# Patient Record
Sex: FEMALE | Race: WHITE | NOT HISPANIC OR LATINO
[De-identification: names, ages, dates, MRNs, and addresses within clinical notes are randomized per-mention and may not be internally consistent; named-entity substitution may affect disease eponyms.]

---

## 2017-11-06 ENCOUNTER — APPOINTMENT (OUTPATIENT)
Dept: OBGYN | Facility: CLINIC | Age: 57
End: 2017-11-06
Payer: COMMERCIAL

## 2017-11-06 VITALS
SYSTOLIC BLOOD PRESSURE: 135 MMHG | BODY MASS INDEX: 34.1 KG/M2 | HEIGHT: 68 IN | DIASTOLIC BLOOD PRESSURE: 84 MMHG | WEIGHT: 225 LBS

## 2017-11-06 DIAGNOSIS — Z01.419 ENCOUNTER FOR GYNECOLOGICAL EXAMINATION (GENERAL) (ROUTINE) W/OUT ABNORMAL FINDINGS: ICD-10-CM

## 2017-11-06 PROCEDURE — 99396 PREV VISIT EST AGE 40-64: CPT

## 2017-11-07 LAB — HPV HIGH+LOW RISK DNA PNL CVX: NOT DETECTED

## 2017-11-10 LAB — CYTOLOGY CVX/VAG DOC THIN PREP: NORMAL

## 2020-01-03 ENCOUNTER — TRANSCRIPTION ENCOUNTER (OUTPATIENT)
Age: 60
End: 2020-01-03

## 2020-03-05 ENCOUNTER — TRANSCRIPTION ENCOUNTER (OUTPATIENT)
Age: 60
End: 2020-03-05

## 2020-07-18 ENCOUNTER — TRANSCRIPTION ENCOUNTER (OUTPATIENT)
Age: 60
End: 2020-07-18

## 2020-09-22 ENCOUNTER — TRANSCRIPTION ENCOUNTER (OUTPATIENT)
Age: 60
End: 2020-09-22

## 2021-03-15 ENCOUNTER — APPOINTMENT (OUTPATIENT)
Dept: OBGYN | Facility: CLINIC | Age: 61
End: 2021-03-15
Payer: COMMERCIAL

## 2021-03-15 VITALS — TEMPERATURE: 97.5 F

## 2021-03-15 VITALS
HEIGHT: 68 IN | BODY MASS INDEX: 36.53 KG/M2 | WEIGHT: 241 LBS | SYSTOLIC BLOOD PRESSURE: 124 MMHG | DIASTOLIC BLOOD PRESSURE: 70 MMHG

## 2021-03-15 PROCEDURE — 99386 PREV VISIT NEW AGE 40-64: CPT

## 2021-03-15 PROCEDURE — 99072 ADDL SUPL MATRL&STAF TM PHE: CPT

## 2021-03-15 NOTE — HISTORY OF PRESENT ILLNESS
[FreeTextEntry1] : re establish care\par S/P supracervical hys \par No problems  [Patient reported mammogram was normal] : Patient reported mammogram was normal [Patient reported PAP Smear was normal] : Patient reported PAP Smear was normal [Patient reported colonoscopy was normal] : Patient reported colonoscopy was normal [Mammogramdate] : 2021  [PapSmeardate] : 2017  [BoneDensityDate] : 2019 [ColonoscopyDate] : 5 years ago

## 2021-03-18 LAB
CYTOLOGY CVX/VAG DOC THIN PREP: NORMAL
HPV HIGH+LOW RISK DNA PNL CVX: NOT DETECTED

## 2023-03-13 ENCOUNTER — APPOINTMENT (OUTPATIENT)
Dept: OBGYN | Facility: CLINIC | Age: 63
End: 2023-03-13
Payer: COMMERCIAL

## 2023-03-13 VITALS
WEIGHT: 230 LBS | BODY MASS INDEX: 34.86 KG/M2 | SYSTOLIC BLOOD PRESSURE: 122 MMHG | DIASTOLIC BLOOD PRESSURE: 80 MMHG | HEIGHT: 68 IN

## 2023-03-13 DIAGNOSIS — Z01.419 ENCOUNTER FOR GYNECOLOGICAL EXAMINATION (GENERAL) (ROUTINE) W/OUT ABNORMAL FINDINGS: ICD-10-CM

## 2023-03-13 DIAGNOSIS — N89.8 OTHER SPECIFIED NONINFLAMMATORY DISORDERS OF VAGINA: ICD-10-CM

## 2023-03-13 PROCEDURE — 99396 PREV VISIT EST AGE 40-64: CPT

## 2023-03-13 NOTE — HISTORY OF PRESENT ILLNESS
[Patient reported mammogram was normal] : Patient reported mammogram was normal [Patient reported PAP Smear was normal] : Patient reported PAP Smear was normal [FreeTextEntry1] : no issues\par s/p supracervical hys \par Patient also complaining of vaginal odor  [Mammogramdate] : 2022 [TextBox_19] : per patient  [PapSmeardate] : 2021  [TextBox_43] : UTD

## 2023-03-14 LAB — HPV HIGH+LOW RISK DNA PNL CVX: NOT DETECTED

## 2023-03-16 LAB — CYTOLOGY CVX/VAG DOC THIN PREP: NORMAL

## 2023-10-12 ENCOUNTER — NON-APPOINTMENT (OUTPATIENT)
Age: 63
End: 2023-10-12

## 2023-10-15 ENCOUNTER — NON-APPOINTMENT (OUTPATIENT)
Age: 63
End: 2023-10-15

## 2024-04-09 ENCOUNTER — APPOINTMENT (OUTPATIENT)
Dept: CARDIOLOGY | Facility: CLINIC | Age: 64
End: 2024-04-09
Payer: MEDICARE

## 2024-04-09 VITALS
DIASTOLIC BLOOD PRESSURE: 76 MMHG | BODY MASS INDEX: 36.68 KG/M2 | SYSTOLIC BLOOD PRESSURE: 116 MMHG | HEIGHT: 68 IN | HEART RATE: 78 BPM | OXYGEN SATURATION: 96 % | WEIGHT: 242 LBS

## 2024-04-09 DIAGNOSIS — R06.02 SHORTNESS OF BREATH: ICD-10-CM

## 2024-04-09 PROCEDURE — 99204 OFFICE O/P NEW MOD 45 MIN: CPT

## 2024-04-09 PROCEDURE — 93000 ELECTROCARDIOGRAM COMPLETE: CPT

## 2024-04-09 RX ORDER — METRONIDAZOLE 7.5 MG/G
0.75 GEL VAGINAL
Qty: 1 | Refills: 0 | Status: DISCONTINUED | COMMUNITY
Start: 2023-03-13 | End: 2024-04-09

## 2024-04-11 NOTE — HISTORY OF PRESENT ILLNESS
[FreeTextEntry1] : Ms. Monet is a 65 yo female with a hx migraines, SRINI (pending therapy) presenting to establish care.   Pt had recent physical 1/2024 with reportedly normal lab work.  Reports having a recent carotid a duplex and echocardiogram as well.    Does c/o recent exertional dyspnea over the past few weeks. Feels winded when climbing a flight of stairs which is new for her.  Denies chest pain, LE edema, palpitations, syncope, dizziness.     Tobacco use: none Alcohol use: weekends- couple drinks at a time Drug use: none  Family hx: 4 siblings  Prior pregnancies : 3; 2 twins and 1 other child - pre - eclampsia post-partum with twin pregnancy  Exercise: no formal, trying to walk more  Caffeine: coffee twice daily

## 2024-04-11 NOTE — ASSESSMENT
[FreeTextEntry1] : Exertional dyspnea  requested recent lab work, echo, carotid performed by PCP  will order coronary CT   will plan for f/u in 1 year unless above findings abnormal  The patient was counseled for 15 minutes regarding the need for aggressive risk factor modification including an effort to achieve an ideal body weight, to limit salt intake, to eat a heart healthy diet, to limit alcohol intake and to increase their physical activity to a level which is appropriate for their age and their conditions.

## 2024-05-08 ENCOUNTER — OUTPATIENT (OUTPATIENT)
Dept: OUTPATIENT SERVICES | Facility: HOSPITAL | Age: 64
LOS: 1 days | End: 2024-05-08
Payer: MEDICARE

## 2024-05-08 ENCOUNTER — APPOINTMENT (OUTPATIENT)
Dept: CT IMAGING | Facility: CLINIC | Age: 64
End: 2024-05-08
Payer: MEDICARE

## 2024-05-08 DIAGNOSIS — R06.02 SHORTNESS OF BREATH: ICD-10-CM

## 2024-05-08 DIAGNOSIS — I25.10 ATHEROSCLEROTIC HEART DISEASE OF NATIVE CORONARY ARTERY WITHOUT ANGINA PECTORIS: ICD-10-CM

## 2024-05-08 PROCEDURE — 75574 CT ANGIO HRT W/3D IMAGE: CPT

## 2024-05-08 PROCEDURE — 75574 CT ANGIO HRT W/3D IMAGE: CPT | Mod: 26,MH

## 2024-07-12 ENCOUNTER — APPOINTMENT (OUTPATIENT)
Dept: INTERNAL MEDICINE | Facility: CLINIC | Age: 64
End: 2024-07-12
Payer: MEDICARE

## 2024-07-12 VITALS
BODY MASS INDEX: 37.83 KG/M2 | TEMPERATURE: 97.7 F | DIASTOLIC BLOOD PRESSURE: 78 MMHG | HEIGHT: 67 IN | OXYGEN SATURATION: 98 % | SYSTOLIC BLOOD PRESSURE: 128 MMHG | WEIGHT: 241 LBS | RESPIRATION RATE: 16 BRPM | HEART RATE: 91 BPM

## 2024-07-12 DIAGNOSIS — R91.1 SOLITARY PULMONARY NODULE: ICD-10-CM

## 2024-07-12 PROCEDURE — ZZZZZ: CPT

## 2024-07-12 PROCEDURE — 94727 GAS DIL/WSHOT DETER LNG VOL: CPT

## 2024-07-12 PROCEDURE — 99203 OFFICE O/P NEW LOW 30 MIN: CPT | Mod: 25

## 2024-07-12 PROCEDURE — 94060 EVALUATION OF WHEEZING: CPT

## 2024-07-12 PROCEDURE — 94729 DIFFUSING CAPACITY: CPT

## 2024-07-31 ENCOUNTER — APPOINTMENT (OUTPATIENT)
Dept: CT IMAGING | Facility: CLINIC | Age: 64
End: 2024-07-31
Payer: MEDICARE

## 2024-07-31 ENCOUNTER — OUTPATIENT (OUTPATIENT)
Dept: OUTPATIENT SERVICES | Facility: HOSPITAL | Age: 64
LOS: 1 days | End: 2024-07-31
Payer: MEDICARE

## 2024-07-31 DIAGNOSIS — R91.1 SOLITARY PULMONARY NODULE: ICD-10-CM

## 2024-07-31 PROCEDURE — 71250 CT THORAX DX C-: CPT | Mod: 26,MH

## 2024-07-31 PROCEDURE — 71250 CT THORAX DX C-: CPT

## 2024-08-05 ENCOUNTER — NON-APPOINTMENT (OUTPATIENT)
Age: 64
End: 2024-08-05

## 2025-02-25 ENCOUNTER — NON-APPOINTMENT (OUTPATIENT)
Age: 65
End: 2025-02-25

## 2025-08-07 ENCOUNTER — OFFICE (OUTPATIENT)
Dept: URBAN - METROPOLITAN AREA CLINIC 102 | Facility: CLINIC | Age: 65
Setting detail: OPHTHALMOLOGY
End: 2025-08-07
Payer: MEDICARE

## 2025-08-07 DIAGNOSIS — D31.30: ICD-10-CM

## 2025-08-07 DIAGNOSIS — H50.111: ICD-10-CM

## 2025-08-07 DIAGNOSIS — H43.393: ICD-10-CM

## 2025-08-07 DIAGNOSIS — H35.373: ICD-10-CM

## 2025-08-07 DIAGNOSIS — H40.033: ICD-10-CM

## 2025-08-07 DIAGNOSIS — H25.13: ICD-10-CM

## 2025-08-07 PROBLEM — H52.7 REFRACTIVE ERROR: Status: ACTIVE | Noted: 2025-08-07

## 2025-08-07 PROCEDURE — 92004 COMPRE OPH EXAM NEW PT 1/>: CPT | Performed by: OPHTHALMOLOGY

## 2025-08-07 PROCEDURE — 92020 GONIOSCOPY: CPT | Performed by: OPHTHALMOLOGY

## 2025-08-07 PROCEDURE — 92250 FUNDUS PHOTOGRAPHY W/I&R: CPT | Performed by: OPHTHALMOLOGY

## 2025-08-07 ASSESSMENT — REFRACTION_CURRENTRX
OD_OVR_VA: 20/
OS_CYLINDER: -1.00
OD_SPHERE: +5.75
OS_ADD: +2.75
OD_ADD: +2.75
OS_AXIS: 099
OS_SPHERE: +6.25
OS_VPRISM_DIRECTION: BF
OD_CYLINDER: -0.25
OS_OVR_VA: 20/
OD_AXIS: 064
OD_VPRISM_DIRECTION: BF

## 2025-08-07 ASSESSMENT — TONOMETRY
OS_IOP_MMHG: 16
OS_IOP_MMHG: 12
OD_IOP_MMHG: 13
OD_IOP_MMHG: 16

## 2025-08-07 ASSESSMENT — KERATOMETRY
OD_K1POWER_DIOPTERS: 44.75
OD_K2POWER_DIOPTERS: 45.25
OS_K2POWER_DIOPTERS: 45.50
OS_AXISANGLE_DEGREES: 002
OD_AXISANGLE_DEGREES: 126
OS_K1POWER_DIOPTERS: 44.50

## 2025-08-07 ASSESSMENT — REFRACTION_AUTOREFRACTION
OS_AXIS: 099
OS_SPHERE: +6.25
OD_AXIS: 047
OD_CYLINDER: -0.50
OD_SPHERE: +6.75
OS_CYLINDER: -1.00

## 2025-08-07 ASSESSMENT — VISUAL ACUITY
OD_BCVA: 20/20
OS_BCVA: 20/30-2

## 2025-08-07 ASSESSMENT — CONFRONTATIONAL VISUAL FIELD TEST (CVF)
OD_FINDINGS: FULL
OS_FINDINGS: FULL